# Patient Record
Sex: MALE | URBAN - METROPOLITAN AREA
[De-identification: names, ages, dates, MRNs, and addresses within clinical notes are randomized per-mention and may not be internally consistent; named-entity substitution may affect disease eponyms.]

---
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
## 2024-11-24 NOTE — PROCEDURE: TREATMENT REGIMEN
Continue Regimen: Acticlate, onexton, tazorac
Modify Regimen: Acticlate taper: 2/3 tablet daily for 1 month then 1/3 tablet daily for 1 month
Detail Level: Zone
Initiate Treatment: Sulfacleanse, BPO cloths, BPO Creamy wash
Warm